# Patient Record
Sex: FEMALE | Race: AMERICAN INDIAN OR ALASKA NATIVE
[De-identification: names, ages, dates, MRNs, and addresses within clinical notes are randomized per-mention and may not be internally consistent; named-entity substitution may affect disease eponyms.]

---

## 2017-12-22 ENCOUNTER — HOSPITAL ENCOUNTER (OUTPATIENT)
Dept: HOSPITAL 5 - SPVWC | Age: 57
Discharge: HOME | End: 2017-12-22
Attending: OBSTETRICS & GYNECOLOGY
Payer: COMMERCIAL

## 2017-12-22 DIAGNOSIS — Z12.31: Primary | ICD-10-CM

## 2017-12-22 PROCEDURE — G0202 SCR MAMMO BI INCL CAD: HCPCS

## 2017-12-22 PROCEDURE — 77067 SCR MAMMO BI INCL CAD: CPT

## 2017-12-25 NOTE — MAMMOGRAPHY REPORT
BILATERAL MAMMOGRAM:



FINDINGS:

The breasts are almost entirely fat (<25% glandular).  No mass,

distortion, suspicious calcification, or skin change is seen. No 

significant change when compared to prior exams dating back to 2015.



CAD was utilized.



IMPRESSION:

Negative mammogram.  There is no mammographic evidence of

malignancy.



RECOMMENDATION:

Follow-up per ACS guidelines.



BI-RADS CATEGORY: 1 = Negative



ACR BI-RADS MAMMOGRAPHIC CODES:

0 = Needs additional imaging evaluation; 1 = Negative; 2 = Benign;

3 = Probably benign; 4 = Suspicious; 5 = Malignant; 6 = Known

biopsy-proven malignancy



COMMENT:

      1.   Dense breast tissue, i.e., adenosis, fibrocystic 

            changes, etc., may obscure an underlying neoplasm.

      2.   Approximately 10% of cancers are not detected with

            mammography.

      3.   A negative mammography report should not delay biopsy 

            if a clinically suspicious mass is present.



COMMENT:

Patient follow-up letters are generated in [employer].

## 2019-07-29 ENCOUNTER — HOSPITAL ENCOUNTER (OUTPATIENT)
Dept: HOSPITAL 5 - SPVWC | Age: 59
Discharge: HOME | End: 2019-07-29
Attending: OBSTETRICS & GYNECOLOGY
Payer: COMMERCIAL

## 2019-07-29 DIAGNOSIS — Z12.31: Primary | ICD-10-CM

## 2019-07-29 PROCEDURE — 77067 SCR MAMMO BI INCL CAD: CPT

## 2019-07-29 NOTE — MAMMOGRAPHY REPORT
BILATERAL DIGITAL SCREENING MAMMOGRAM WITH CAD



INDICATION: Routine screening mammography. 



TECHNIQUE:  Digital bilateral  2D mammography was obtained in the craniocaudal and mediolateral obliq
ue projections. This examination was interpreted with the benefit of Computer-Aided Detection analysi
s.



COMPARISON: 7/19/2016 and 12/22/2017



FINDINGS: 



Breast Density: The breasts are almost entirely fatty.



No mass, architectural distortion or suspicious calcifications. Bilateral benign calcifications.



IMPRESSION:No mammographic evidence of malignancy.



BI-RADS Category 2:  Benign.  No mammographic evidence of malignancy.  Recommend routine screening ma
mmography in one year.



A "normal" or negative report should not discourage follow up or biopsy of a clinically significant f
inding.



A written summary of these findings will be mailed to the patient. The patient will be entered into a
 mammography reporting system which will generate a reminder letter for the patient's next appointmen
t at the appropriate interval.



The American College of Radiology recommends yearly mammograms starting at age 40 and continuing as l
abdoul as a woman is in good health.  Breast MRI is recommended for women with an approximate 20-25% or 
greater lifetime risk of breast cancer, including women with a strong family history of breast or ova
arnaud cancer or who have been treated for Hodgkin's disease.



Signer Name: Keith Moss MD 

Signed: 7/29/2019 11:31 AM

 Workstation Name: [id number]

## 2020-03-23 NOTE — HISTORY AND PHYSICAL REPORT
History of Present Illness


Date of examination: 20


Chief complaint: 





Postmenopausal bleeding and endometrial mass


History of present illness: 


ast Pregnancy History 


   :      2


   Term Births:      2


   Living Children:   2


   Prev :   2





Pregnancy # 1


   Delivery date:     


   Delivery type:     


   Comments:      c/sx2





Pregnancy # 2


   Delivery type:     








GYN History 


Operations: Tubal Ligation


Ventral hernoa repair


colon polypectomy thru periumbilical incision


Breast Reduction: 


C-sectionx2


hiatal hernia repair (2020)


Abnormal PAP: negative





Infection History 


HIV Risk Eval: no


Hx of STD: HSV





Active Medications (reviewed today):


LINZESS 145 MCG ORAL CAPSULE (LINACLOTIDE) 


TRIBENZOR 40-10-12.5 MG ORAL TABLET (Olmesartan-Amlodipine-HCTZ) 





Current Allergies (reviewed today):


No known allergies








Past Medical History:


   Reviewed history from 2012 and no changes required:


      Hypertension


      [facility]





Past Surgical History:


   Tubal Ligation


   Ventral hernoa repair


   colon polypectomy thru periumbilical incision


   Breast Reduction: 


   C-sectionx2


   hiatal hernia repair (2020)





Family History Summary: 


   Reviewed history Last on 2019 and no changes required:2020


Other Family Member - Has No Family History of Uterine Cancer - Entered On: 

2016


Other Family Member - Has No Family History of Small Bowel Cancer - Entered On: 

2016


Other Family Member - Has No Family History of Stomach Cancer - Entered On: 

2016


Other Family Member - Has No Family History of Pancreatic Cancer - Entered On: 

2016


Other Family Member - Has No Family History of Ovarvian Cancer - Entered On: 

2016


Other Family Member - Has No Family History of Kidney/Urinary Tract Cancer - 

Entered On: 2016


Other Family Member - Has No Family History of DVT/PE on OCP - Entered On: 

2016


Other Family Member - Has No Family History of Colon Cancer - Entered On: 

2016


Other Family Member - Has No Family History of Brain Cancer - Entered On: 

2016


Other Family Member - Has No Family History of Biliary Tract Cancer - Entered 

On: 2016





General Comments - FH:


Family History Breast Cancer cousins (maternal great aunt's grandchildren)


No Family History of Colon Cancer


No Family History of Ovarvian Cancer








Social History:


   Reviewed history from 2017 and no changes required:


      Patient is 


      


      Smoking History:


      Patient has never smoked.








Risk Factors: 





Alcohol use:  yes





Previous Tobacco Use: Signed On 2020


Smoked Tobacco Use:  Never smoker


Smokeless Tobacco Use:  Never


Passive smoke exposure:  no


Drug use:  no


HIV high-risk behavior:  no





Previous Alcohol Use: Signed On 2020


Alcohol use:  yes


   Type:  occ


   Drinks per day:  social


Exercise:  no


Seatbelt use:  100 %





Colonoscopy History:


   Date of Last Colonoscopy:  07/15/2014





Mammogram History:


   Date of Last Mammogram:  2019





PAP Smear History:


   Date of Last PAP Smear:  2019














Physical Exam 


Appearance: well developed, well nourished, no acute distress





Other Exams 


Lungs: no rales, rhonchi, or wheezes


Heart: S1, S2, no murmur, rub, or gallop





Genitourinary Exam 


Uterus: deferred for EUA











Impression & Recommendations:





Problem # 1:  Postmenopausal Bleeding (ICD-627.1) (BBN94-X31.0)


Patient desires to proceed wtih hysteroscopy D&C and removal of endometrial ma

ss.





Consent reviewed and signed . Possible laparoscopy or laparotomy explained to 

patient. The risks and alternatives for this surgery were reviewed with the 

patient. She was informed of possible bleeding, infection, injury to bowel, 

bladder, ureters or other adjacent organs. The patient was instructed/informed 

the following:


   The normal length of hospital stay for this procedure.


   Nothing to eat or drink after midnight the evening prior to surgery. 


   Clear liquids after dinner the night before surgery.  


Pre-op instruction sheets given. Patient to call for any signs or symptoms of 

infection. The usual discomforts associated with this procedure were detailed.  

Patient was given ample opportunity to have all her questions answered before 

signing informed consent. 





Problem # 2:  Endometrial mass (ICD-236.0) (HPV76-J88.0)











Medications and Allergies


                                    Allergies











Allergy/AdvReac Type Severity Reaction Status Date / Time


 


No Known Allergies Allergy   Unverified 20 12:05











                                Home Medications











 Medication  Instructions  Recorded  Confirmed  Last Taken  Type


 


Latanoprost 0.005% 1 drop OU HS 20 Unknown History


 


Linaclotide [Linzess] 1 cap PO DAILY PRN 20 Unknown History


 


Olmesartan/Amlodipin/Hcthiazid 1 each PO DAILY 20 Unknown History





[Tribenzor 20-5-12.5 mg Tablet]     











Active Meds: 


Active Medications





Cefazolin Sodium (Ancef/Sterile Water 2 Gm/20 Ml)  2 gm in 20 mls @ 80 mls/hr IV

PREOP NR; Protocol











Assessment and Plan





- Patient Problems


(1) Postmenopausal bleeding


Status: Acute   





(2) Endometrial mass


Status: Acute

## 2020-03-31 ENCOUNTER — HOSPITAL ENCOUNTER (OUTPATIENT)
Dept: HOSPITAL 5 - OR | Age: 60
Discharge: HOME | End: 2020-03-31
Attending: OBSTETRICS & GYNECOLOGY
Payer: COMMERCIAL

## 2020-03-31 VITALS — DIASTOLIC BLOOD PRESSURE: 77 MMHG | SYSTOLIC BLOOD PRESSURE: 136 MMHG

## 2020-03-31 DIAGNOSIS — I10: ICD-10-CM

## 2020-03-31 DIAGNOSIS — E66.9: ICD-10-CM

## 2020-03-31 DIAGNOSIS — Z98.890: ICD-10-CM

## 2020-03-31 DIAGNOSIS — N95.0: Primary | ICD-10-CM

## 2020-03-31 DIAGNOSIS — Z98.51: ICD-10-CM

## 2020-03-31 DIAGNOSIS — Z72.89: ICD-10-CM

## 2020-03-31 DIAGNOSIS — Z79.899: ICD-10-CM

## 2020-03-31 DIAGNOSIS — N94.89: ICD-10-CM

## 2020-03-31 DIAGNOSIS — N85.01: ICD-10-CM

## 2020-03-31 DIAGNOSIS — K21.9: ICD-10-CM

## 2020-03-31 DIAGNOSIS — H40.9: ICD-10-CM

## 2020-03-31 LAB
HCT VFR BLD CALC: 34.8 % (ref 30.3–42.9)
HGB BLD-MCNC: 12.1 GM/DL (ref 10.1–14.3)

## 2020-03-31 PROCEDURE — 85014 HEMATOCRIT: CPT

## 2020-03-31 PROCEDURE — 86900 BLOOD TYPING SEROLOGIC ABO: CPT

## 2020-03-31 PROCEDURE — A4217 STERILE WATER/SALINE, 500 ML: HCPCS

## 2020-03-31 PROCEDURE — 86901 BLOOD TYPING SEROLOGIC RH(D): CPT

## 2020-03-31 PROCEDURE — 81025 URINE PREGNANCY TEST: CPT

## 2020-03-31 PROCEDURE — 58558 HYSTEROSCOPY BIOPSY: CPT

## 2020-03-31 PROCEDURE — 85018 HEMOGLOBIN: CPT

## 2020-03-31 PROCEDURE — 86850 RBC ANTIBODY SCREEN: CPT

## 2020-03-31 PROCEDURE — 36415 COLL VENOUS BLD VENIPUNCTURE: CPT

## 2020-03-31 PROCEDURE — 88305 TISSUE EXAM BY PATHOLOGIST: CPT

## 2020-03-31 PROCEDURE — 84132 ASSAY OF SERUM POTASSIUM: CPT

## 2020-03-31 NOTE — DISCHARGE SUMMARY
Providers





- Providers


Date of discharge: 03/31/20


Attending physician: 


YANCY SOOD





Primary care physician: 


SABINO HARDEN








Hospitalization


Condition: Good


Procedures: 


See op note


Hospital course: 





Uncomplicated


Disposition: DC-01 TO HOME OR SELFCARE





- Discharge Diagnoses


(1) Postmenopausal bleeding


Status: Acute   





(2) Endometrial mass


Status: Acute   





Core Measure Documentation





- Palliative Care


Palliative Care/ Comfort Measures: Not Applicable





- Core Measures


Any of the following diagnoses?: none





Exam





- Constitutional


Vitals: 


                                        











Temp Pulse Resp BP Pulse Ox


 


 97.1 F L  64   21   160/72   97 


 


 03/31/20 08:45  03/31/20 09:15  03/31/20 09:15  03/31/20 09:15  03/31/20 09:15











General appearance: Present: no acute distress





- Respiratory


Respiratory effort: normal





- Cardiovascular


Rhythm: regular





- Psychiatric


Psychiatric: appropriate mood/affect, intact judgment & insight, memory intact, 

cooperative





Plan


Activity: other (No sex, may drive tomorrow)


Weight Bearing Status: Full Weight Bearing


Diet: low fat, low cholesterol, low salt


Special Instructions: no heavy lifting (Greater than 25lbs)


Care Plan Goals: 


Normal post op care


Plan of Treatment: 


see discharge instrn's


Follow up with: 


SABINO HARDEN MD [Primary Care Provider] - 7 Days


YANCY SOOD MD [Staff Physician] -  (As scheduled. If stable at the time 

of post op visit may change visit to telemedicine visit instead of presenting to

the office)

## 2020-03-31 NOTE — OPERATIVE REPORT
Operative Report


Operative Report: 


Date;   3/30/2020


PREOPERATIVE DIAGNOSES:


1.  Postmenopausal bleeding with endometrial mass





POSTOPERATIVE DIAGNOSES:


1.  Postmenopausal bleeding with endometrial mass








PROCEDURE PERFORMED:


1.  Cervical dilation and uterine curettage (D&C).


2.  Hysteroscopy.


3.  Resection of endometrial mass





ANESTHESIA: General





ESTIMATED BLOOD LOSS: Less than minimal cc.











INDICATIONS: This is a 59-year-old female that presents above.





PROCEDURE: The patient was seen in the preoperative suite.  Expected procedure 

and postoperative course discussed with her. She was taken to the operative 

suite where general anesthesia was induced with only the anesthetist and 

anesthesiologist present.  She was placed in a dorsal lithotomy position.  She 

was prepped and draped in the normal sterile fashion.  Timeout was performed.  

Her bladder was drained with the red rubber catheter which produced 

approximately 100 cc of clear yellow urine. . A bivalve operative speculum was 

placed in  the vagina. The cervix and vagina were grossly normal with no obvious

masses or deformities and the anterior lip of the cervix was grasped with the 

single-tooth tenaculum.





The uterus was sounded to ~10 cm. The cervix was progressively dilated to allow 

the operative hysteroscope.  On initial introduction a polyp was noted in the 

internal cervical office.  The polyp was grasped with tissue extractor forceps 

and removed.  Under direct visualization, the ostia were within normal limits. 

The endometrial lining appeared to have remnants of the polyp that were removed 

as well as a right anterior fundal polyp noted.  However, there was no obvious 

evidence of malignancy.  At this point the  Myosure lite device was used to 

resect the remainder of the remnant of the polyp noted as well as the right 

anterior fundal polyp.  The hysteroscope was removed and a small sharp curette 

was placed intrauterine very carefully using anterior wall for guidance. 

Endometrial curettings were obtained. The endometrial sampling was placed on 

Telfa pad and sent to Pathology for evaluation, permanent.  The hysteroscope was

introduced again, no evidence of perforation was noted.  At this point procedure

was ended.  The single-tooth tenaculum and speculum were removed. The cervix was

found to be hemostatic. 





Counts were correct.  Patient was taken to the PACU stable.





Distention fluid: Normal saline





Deficit: 100 mL

## 2020-03-31 NOTE — ANESTHESIA CONSULTATION
Anesthesia Consult and Med Hx


Date of service: 03/31/20





- Airway


Anesthetic Teeth Evaluation: Good


ROM Head & Neck: Adequate


Mental/Hyoid Distance: Adequate


Mallampati Class: Class III


Intubation Access Assessment: Possibly Difficult





- Pulmonary Exam


CTA: Yes





- Cardiac Exam


Cardiac Exam: RRR





- Pre-Operative Health Status


ASA Pre-Surgery Classification: ASA2


Proposed Anesthetic Plan: General





- Pulmonary


Hx Smoking: No


Hx Respiratory Symptoms: No





- Cardiovascular System


Hx Hypertension: Yes


Hx Heart Attack/AMI: No





- Central Nervous System


CVA: No





- Gastrointestinal


Hx Gastroesophageal Reflux Disease: Yes (previous hx; resolved since hiatal 

hernia repair)





- Endocrine


Hx Renal Disease: No


Hx Liver Disease: No


Hx Insulin Dependent Diabetes: No


Hx Non-Insulin Dependent Diabetes: No


Hx Thyroid Disease: No





- Other Systems


Hx Obesity: Yes (BMI 36)





- Additional Comments


Anesthesia Medical History Comments: No hx anesthetic complications.

## 2020-08-13 ENCOUNTER — HOSPITAL ENCOUNTER (OUTPATIENT)
Dept: HOSPITAL 5 - SPVWC | Age: 60
Discharge: HOME | End: 2020-08-13
Attending: OBSTETRICS & GYNECOLOGY
Payer: COMMERCIAL

## 2020-08-13 DIAGNOSIS — Z12.31: Primary | ICD-10-CM

## 2020-08-13 PROCEDURE — 77067 SCR MAMMO BI INCL CAD: CPT

## 2020-08-14 NOTE — MAMMOGRAPHY REPORT
DIGITAL SCREENING MAMMOGRAM WITH CAD, 8/13/2020



INDICATION: Routine screening mammography. 



TECHNIQUE:  Digital bilateral  2D mammography was obtained in the craniocaudal and mediolateral obliq
ue projections. This examination was interpreted with the benefit of Computer-Aided Detection analysi
s.



COMPARISON: 12/22/2017, 7/29/2019



FINDINGS: 



Breast Density: There are scattered areas of fibroglandular density.



There is no evidence of dominant mass, suspicious calcifications or architectural distortion in eithe
r breast. Bilateral changes consistent with history of reduction mammoplasty are noted. Overall, no i
nterval change.



IMPRESSION: Benign findings. No evidence of malignancy.



Follow up recommendation: Routine yearly



BI-RADS Category 2:  Benign.



A "normal" or negative report should not discourage follow up or biopsy of a clinically significant f
inding.



A written summary of these findings will be mailed to the patient. The patient will be entered into a
 mammography reporting system which will generate a reminder letter for the patient's next appointmen
t at the appropriate interval.



The [employer] of [employer] recommends yearly mammograms starting at age 40 and continuing as l
abdoul as a woman is in good health.  Breast MRI is recommended for women with an approximate 20-25% or 
greater lifetime risk of breast cancer, including women with a strong family history of breast or ova
arnaud cancer or who have been treated for Hodgkin's disease.



Signer Name: Micki Hicks MD 

Signed: 8/14/2020 1:52 PM

Workstation Name: [id number]